# Patient Record
Sex: FEMALE | Race: WHITE | NOT HISPANIC OR LATINO | URBAN - METROPOLITAN AREA
[De-identification: names, ages, dates, MRNs, and addresses within clinical notes are randomized per-mention and may not be internally consistent; named-entity substitution may affect disease eponyms.]

---

## 2018-07-16 ENCOUNTER — OFFICE (OUTPATIENT)
Dept: URBAN - METROPOLITAN AREA CLINIC 101 | Facility: CLINIC | Age: 83
End: 2018-07-16

## 2018-07-16 VITALS
DIASTOLIC BLOOD PRESSURE: 82 MMHG | WEIGHT: 212 LBS | HEIGHT: 62 IN | HEART RATE: 90 BPM | SYSTOLIC BLOOD PRESSURE: 160 MMHG | TEMPERATURE: 97.5 F

## 2018-07-16 DIAGNOSIS — R10.11 RIGHT UPPER QUADRANT PAIN: ICD-10-CM

## 2018-07-16 PROCEDURE — 99204 OFFICE O/P NEW MOD 45 MIN: CPT

## 2018-08-14 ENCOUNTER — ON CAMPUS - OUTPATIENT (OUTPATIENT)
Dept: URBAN - METROPOLITAN AREA HOSPITAL 35 | Facility: HOSPITAL | Age: 83
End: 2018-08-14
Payer: COMMERCIAL

## 2018-08-14 DIAGNOSIS — K29.60 OTHER GASTRITIS WITHOUT BLEEDING: ICD-10-CM

## 2018-08-14 DIAGNOSIS — K21.0 GASTRO-ESOPHAGEAL REFLUX DISEASE WITH ESOPHAGITIS: ICD-10-CM

## 2018-08-14 DIAGNOSIS — R10.13 EPIGASTRIC PAIN: ICD-10-CM

## 2018-08-14 DIAGNOSIS — R13.10 DYSPHAGIA, UNSPECIFIED: ICD-10-CM

## 2018-08-14 PROCEDURE — 43239 EGD BIOPSY SINGLE/MULTIPLE: CPT

## 2018-09-18 ENCOUNTER — OFFICE (OUTPATIENT)
Dept: URBAN - METROPOLITAN AREA CLINIC 33 | Facility: CLINIC | Age: 83
End: 2018-09-18
Payer: COMMERCIAL

## 2018-09-18 VITALS
TEMPERATURE: 97.2 F | SYSTOLIC BLOOD PRESSURE: 134 MMHG | DIASTOLIC BLOOD PRESSURE: 73 MMHG | HEIGHT: 62 IN | WEIGHT: 208 LBS | HEART RATE: 81 BPM

## 2018-09-18 DIAGNOSIS — R10.11 RIGHT UPPER QUADRANT PAIN: ICD-10-CM

## 2018-09-18 PROCEDURE — 99213 OFFICE O/P EST LOW 20 MIN: CPT

## 2018-09-18 NOTE — SERVICEHPINOTES
Ms. Peralta is an 82 yr old female here for follow up regarding RUQ pain. She saw Dr. Fulton for this two months ago. Pain used to occur daily and her RUQ area felt "large" and she had dull pain that could last all day. No sharp pain or relationship to PO intake. She was concerned about gallstones. She underwent a RUQ US which was unremarkable. A HIDA scan revealed an EF of 29%. She saw a general surgeon who recommended that she try a PPI. She was prescribed Pantoprazole which has fully alleviated all of her pain one day after she started it. She underwent an EGD which showed mild gastritis and esophagitis. Prior to developing the RUQ pain she had heartburn and reflux. She has no UGI issues whatsoever since starting Pantoprazole. She takes a daily baby ASA when she remembers and Ibuprofen "at times". She had a negative cologuard test last year.

## 2019-12-13 ENCOUNTER — OFFICE (OUTPATIENT)
Dept: URBAN - METROPOLITAN AREA CLINIC 78 | Facility: CLINIC | Age: 84
End: 2019-12-13
Payer: COMMERCIAL

## 2019-12-13 VITALS — WEIGHT: 227 LBS | TEMPERATURE: 97.2 F | HEIGHT: 62 IN

## 2019-12-13 DIAGNOSIS — R10.11 RIGHT UPPER QUADRANT PAIN: ICD-10-CM

## 2019-12-13 PROCEDURE — 99214 OFFICE O/P EST MOD 30 MIN: CPT

## 2019-12-13 NOTE — SERVICEHPINOTES
Ms. Peralta is an 82 yr old female here for follow up regarding RUQ pain. She was evaluated for this pain last year by Dr. Fulton. Pain used to occur daily and her RUQ area felt "large" and she had dull pain that could last all day. No sharp pain or relationship to PO intake. She was concerned about gallstones. She underwent a RUQ US which was unremarkable. A HIDA scan revealed an EF of 29%. She saw a general surgeon who recommended that she try a PPI. She underwent an EGD which showed mild gastritis and esophagitis. She was prescribed Pantoprazole which immediately alleviated all pain for one year until recently. Right after Thanksgiving of 2019 the pain returned, though not as intense and in a smaller area. Pain lasted for a few days and went away. She is now completely asymptomatic with no UGI issues.

## 2021-12-23 ENCOUNTER — APPOINTMENT (RX ONLY)
Dept: URBAN - METROPOLITAN AREA CLINIC 371 | Facility: CLINIC | Age: 86
Setting detail: DERMATOLOGY
End: 2021-12-23

## 2021-12-23 DIAGNOSIS — D22 MELANOCYTIC NEVI: ICD-10-CM

## 2021-12-23 DIAGNOSIS — L82.1 OTHER SEBORRHEIC KERATOSIS: ICD-10-CM

## 2021-12-23 PROBLEM — D48.5 NEOPLASM OF UNCERTAIN BEHAVIOR OF SKIN: Status: ACTIVE | Noted: 2021-12-23

## 2021-12-23 PROCEDURE — 11102 TANGNTL BX SKIN SINGLE LES: CPT

## 2021-12-23 PROCEDURE — 99202 OFFICE O/P NEW SF 15 MIN: CPT | Mod: 25

## 2021-12-23 PROCEDURE — ? COUNSELING

## 2021-12-23 PROCEDURE — ? BIOPSY BY SHAVE METHOD

## 2021-12-23 ASSESSMENT — LOCATION ZONE DERM
LOCATION ZONE: TRUNK
LOCATION ZONE: LEG

## 2021-12-23 ASSESSMENT — LOCATION SIMPLE DESCRIPTION DERM
LOCATION SIMPLE: UPPER BACK
LOCATION SIMPLE: LEFT PRETIBIAL REGION

## 2021-12-23 ASSESSMENT — LOCATION DETAILED DESCRIPTION DERM
LOCATION DETAILED: SUPERIOR THORACIC SPINE
LOCATION DETAILED: LEFT PROXIMAL PRETIBIAL REGION

## 2021-12-23 ASSESSMENT — PAIN INTENSITY VAS: HOW INTENSE IS YOUR PAIN 0 BEING NO PAIN, 10 BEING THE MOST SEVERE PAIN POSSIBLE?: NO PAIN

## 2021-12-23 NOTE — PROCEDURE: BIOPSY BY SHAVE METHOD
Anesthesia Volume In Cc (Will Not Render If 0): 0.5
Dressing: bandage
Information: Selecting Yes will display possible errors in your note based on the variables you have selected. This validation is only offered as a suggestion for you. PLEASE NOTE THAT THE VALIDATION TEXT WILL BE REMOVED WHEN YOU FINALIZE YOUR NOTE. IF YOU WANT TO FAX A PRELIMINARY NOTE YOU WILL NEED TO TOGGLE THIS TO 'NO' IF YOU DO NOT WANT IT IN YOUR FAXED NOTE.
Depth Of Biopsy: dermis
Hide Biopsy Depth?: No
X Size Of Lesion In Cm: 0
Curettage Text: The wound bed was treated with curettage after the biopsy was performed.
Billing Type: Third-Party Bill
Silver Nitrate Text: The wound bed was treated with silver nitrate after the biopsy was performed.
Wound Care: Petrolatum
Post-Care Instructions: I reviewed with the patient in detail post-care instructions. Patient is to keep the biopsy site dry overnight, and then apply bacitracin twice daily until healed. Patient may apply hydrogen peroxide soaks to remove any crusting.
Electrodesiccation And Curettage Text: The wound bed was treated with electrodesiccation and curettage after the biopsy was performed.
Electrodesiccation Text: The wound bed was treated with electrodesiccation after the biopsy was performed.
Anesthesia Type: 1% lidocaine with epinephrine
Biopsy Method: Dermablade
Type Of Destruction Used: Curettage
Detail Level: Detailed
Consent: Written consent was obtained and risks were reviewed including but not limited to scarring, infection, bleeding, scabbing, incomplete removal, nerve damage and allergy to anesthesia.
Cryotherapy Text: The wound bed was treated with cryotherapy after the biopsy was performed.
Biopsy Type: H and E
Notification Instructions: Patient will be notified of biopsy results. However, patient instructed to call the office if not contacted within 2 weeks.
Hemostasis: Aluminum Chloride
Was A Bandage Applied: Yes

## 2022-01-10 ENCOUNTER — PREPPED CHART (OUTPATIENT)
Dept: URBAN - METROPOLITAN AREA CLINIC 66 | Facility: CLINIC | Age: 87
End: 2022-01-10

## 2022-01-10 PROBLEM — H35.3112 DRY AMD, INTERMEDIATE DRY STAGE: Status: STABILIZING | Noted: 2022-01-10

## 2022-01-10 PROBLEM — E11.9 DIABETES, TYPE II, NO OCULAR COMPLICATIONS: Status: STABILIZING | Noted: 2022-01-10

## 2022-01-10 PROBLEM — H40.052 OCULAR HYPERTENSION: Noted: 2022-01-10

## 2022-01-10 PROBLEM — H43.813 POSTERIOR VITREOUS DETACHMENT: Noted: 2022-01-10

## 2022-01-10 PROBLEM — H43.813 POSTERIOR VITREOUS DETACHMENT: Status: STABILIZING | Noted: 2022-01-10

## 2022-01-10 PROBLEM — H35.3221 NEOVASCULAR AMD WITH ACTIVE CNV: Status: STABILIZING | Noted: 2022-01-10

## 2022-01-10 PROBLEM — H35.3112 DRY AMD, INTERMEDIATE DRY STAGE: Noted: 2022-01-10

## 2022-01-10 PROBLEM — H40.053 OCULAR HYPERTENSION: Noted: 2022-01-10

## 2022-01-10 PROBLEM — E11.9 DIABETES, TYPE II, NO OCULAR COMPLICATIONS: Noted: 2022-01-10

## 2022-01-10 PROBLEM — H35.3221 NEOVASCULAR AMD WITH ACTIVE CNV: Noted: 2022-01-10

## 2022-01-10 PROBLEM — H35.443 AGE-RELATED RETICULAR DEGENERATION OF RETINA: Noted: 2022-01-10

## 2022-02-24 ENCOUNTER — APPOINTMENT (RX ONLY)
Dept: URBAN - METROPOLITAN AREA CLINIC 371 | Facility: CLINIC | Age: 87
Setting detail: DERMATOLOGY
End: 2022-02-24

## 2022-02-24 DIAGNOSIS — D18.0 HEMANGIOMA: ICD-10-CM

## 2022-02-24 DIAGNOSIS — L57.0 ACTINIC KERATOSIS: ICD-10-CM | Status: INADEQUATELY CONTROLLED

## 2022-02-24 DIAGNOSIS — Z71.89 OTHER SPECIFIED COUNSELING: ICD-10-CM

## 2022-02-24 DIAGNOSIS — L81.4 OTHER MELANIN HYPERPIGMENTATION: ICD-10-CM

## 2022-02-24 DIAGNOSIS — D22 MELANOCYTIC NEVI: ICD-10-CM

## 2022-02-24 DIAGNOSIS — L82.1 OTHER SEBORRHEIC KERATOSIS: ICD-10-CM

## 2022-02-24 PROBLEM — D18.01 HEMANGIOMA OF SKIN AND SUBCUTANEOUS TISSUE: Status: ACTIVE | Noted: 2022-02-24

## 2022-02-24 PROBLEM — D22.5 MELANOCYTIC NEVI OF TRUNK: Status: ACTIVE | Noted: 2022-02-24

## 2022-02-24 PROCEDURE — 99213 OFFICE O/P EST LOW 20 MIN: CPT | Mod: 25

## 2022-02-24 PROCEDURE — ? LIQUID NITROGEN

## 2022-02-24 PROCEDURE — ? COUNSELING

## 2022-02-24 PROCEDURE — 17000 DESTRUCT PREMALG LESION: CPT

## 2022-02-24 ASSESSMENT — LOCATION DETAILED DESCRIPTION DERM
LOCATION DETAILED: RIGHT PROXIMAL POSTERIOR UPPER ARM
LOCATION DETAILED: EPIGASTRIC SKIN
LOCATION DETAILED: PERIUMBILICAL SKIN
LOCATION DETAILED: MIDDLE STERNUM

## 2022-02-24 ASSESSMENT — TOTAL NUMBER OF LESIONS: # OF LESIONS?: 1

## 2022-02-24 ASSESSMENT — LOCATION SIMPLE DESCRIPTION DERM
LOCATION SIMPLE: CHEST
LOCATION SIMPLE: ABDOMEN
LOCATION SIMPLE: RIGHT POSTERIOR UPPER ARM

## 2022-02-24 ASSESSMENT — LOCATION ZONE DERM
LOCATION ZONE: ARM
LOCATION ZONE: TRUNK

## 2022-02-24 ASSESSMENT — PAIN INTENSITY VAS: HOW INTENSE IS YOUR PAIN 0 BEING NO PAIN, 10 BEING THE MOST SEVERE PAIN POSSIBLE?: NO PAIN

## 2022-02-24 NOTE — PROCEDURE: LIQUID NITROGEN
Number Of Freeze-Thaw Cycles: 1 freeze-thaw cycle
Detail Level: Zone
Render Note In Bullet Format When Appropriate: No
Show Applicator Variable?: Yes
Consent: The patient's consent was obtained including but not limited to risks of crusting, scabbing, blistering, scarring, darker or lighter pigmentary change, recurrence, incomplete removal and infection.
Duration Of Freeze Thaw-Cycle (Seconds): 0
Post-Care Instructions: I reviewed with the patient in detail post-care instructions. Patient is to wear sunprotection, and avoid picking at any of the treated lesions. Pt may apply Vaseline to crusted or scabbing areas.

## 2022-03-21 ASSESSMENT — TONOMETRY
OD_IOP_MMHG: 20
OS_IOP_MMHG: 21

## 2022-03-21 ASSESSMENT — VISUAL ACUITY
OS_SC: 20/30-2
OS_PH: 20/25-1
OD_PH: 20/20
OD_SC: 20/30-1

## 2022-03-24 ENCOUNTER — FOLLOW UP (OUTPATIENT)
Dept: URBAN - METROPOLITAN AREA CLINIC 66 | Facility: CLINIC | Age: 87
End: 2022-03-24

## 2022-03-24 DIAGNOSIS — H35.3112: ICD-10-CM

## 2022-03-24 DIAGNOSIS — H35.3221: ICD-10-CM

## 2022-03-24 PROCEDURE — 99214 OFFICE O/P EST MOD 30 MIN: CPT | Mod: 25

## 2022-03-24 PROCEDURE — 67028 INJECTION EYE DRUG: CPT

## 2022-03-24 PROCEDURE — PF5 LUCENTIS PREFILLED SYRINGE

## 2022-03-24 PROCEDURE — 92134 CPTRZ OPH DX IMG PST SGM RTA: CPT

## 2022-03-24 ASSESSMENT — TONOMETRY
OD_IOP_MMHG: 22
OS_IOP_MMHG: 20

## 2022-03-24 ASSESSMENT — VISUAL ACUITY
OD_SC: 20/25
OS_SC: 20/40-

## 2022-05-26 ENCOUNTER — FOLLOW UP (OUTPATIENT)
Dept: URBAN - METROPOLITAN AREA CLINIC 66 | Facility: CLINIC | Age: 87
End: 2022-05-26

## 2022-05-26 DIAGNOSIS — H35.3221: ICD-10-CM

## 2022-05-26 DIAGNOSIS — H40.052: ICD-10-CM

## 2022-05-26 DIAGNOSIS — E11.9: ICD-10-CM

## 2022-05-26 DIAGNOSIS — H35.3112: ICD-10-CM

## 2022-05-26 DIAGNOSIS — H43.813: ICD-10-CM

## 2022-05-26 PROCEDURE — 99214 OFFICE O/P EST MOD 30 MIN: CPT | Mod: 25

## 2022-05-26 PROCEDURE — PF5 LUCENTIS PREFILLED SYRINGE

## 2022-05-26 PROCEDURE — 67028 INJECTION EYE DRUG: CPT

## 2022-05-26 PROCEDURE — 92134 CPTRZ OPH DX IMG PST SGM RTA: CPT

## 2022-05-26 ASSESSMENT — VISUAL ACUITY
OS_PH: 20/20-2
OD_SC: 20/20
OS_SC: 20/60

## 2022-05-26 ASSESSMENT — TONOMETRY
OS_IOP_MMHG: 21
OD_IOP_MMHG: 24

## 2022-07-08 ENCOUNTER — OFFICE (OUTPATIENT)
Dept: URBAN - METROPOLITAN AREA TELEHEALTH 3 | Facility: TELEHEALTH | Age: 87
End: 2022-07-08
Payer: COMMERCIAL

## 2022-07-08 VITALS — WEIGHT: 230 LBS | HEIGHT: 62 IN

## 2022-07-08 DIAGNOSIS — R10.11 RIGHT UPPER QUADRANT PAIN: ICD-10-CM

## 2022-07-08 DIAGNOSIS — K59.09 OTHER CONSTIPATION: ICD-10-CM

## 2022-07-08 PROCEDURE — 99214 OFFICE O/P EST MOD 30 MIN: CPT | Performed by: PHYSICIAN ASSISTANT

## 2022-07-08 NOTE — SERVICEHPINOTES
PATIENT VERIFIED BY DATE OF BIRTH AND NAME. Patient has been consented for this telecommunication visit.   Pt is here for f/u. She has followed with Dr. Fulton previously for abdominal. She has chronic constipation and wants to know what to take for this. Has never tried Miralax. She notes constipation since at least 1970s. She notices that the constipation exacerbates her back pain. No abdominal pain, wt loss, or blood in the stool. Doesn't want a colonoscopy.  
br

florencio A HIDA scan revealed an EF of 29%. She saw a general surgeon who recommended that she try a PPI. She underwent an EGD which showed mild gastritis and esophagitis. She was on Pantoprazole with goo control of her RUQ pain but her nephrologist advises against it. Famotidine 10 mg TID keeps symptoms under control as well. 
br
br
br  ROS as per HPI and o/w unremarkable.florencio matias

## 2022-07-14 ENCOUNTER — FOLLOW UP (OUTPATIENT)
Dept: URBAN - METROPOLITAN AREA CLINIC 66 | Facility: CLINIC | Age: 87
End: 2022-07-14

## 2022-07-14 DIAGNOSIS — H43.813: ICD-10-CM

## 2022-07-14 DIAGNOSIS — H40.052: ICD-10-CM

## 2022-07-14 DIAGNOSIS — E11.9: ICD-10-CM

## 2022-07-14 DIAGNOSIS — H35.3112: ICD-10-CM

## 2022-07-14 DIAGNOSIS — H35.3221: ICD-10-CM

## 2022-07-14 PROCEDURE — PF5 LUCENTIS PREFILLED SYRINGE

## 2022-07-14 PROCEDURE — 92014 COMPRE OPH EXAM EST PT 1/>: CPT | Mod: 25

## 2022-07-14 PROCEDURE — 92134 CPTRZ OPH DX IMG PST SGM RTA: CPT

## 2022-07-14 PROCEDURE — 67028 INJECTION EYE DRUG: CPT

## 2022-07-14 ASSESSMENT — VISUAL ACUITY
OS_PH: 20/25
OD_SC: 20/30
OD_PH: 20/25-2
OS_SC: 20/40

## 2022-07-14 ASSESSMENT — TONOMETRY
OD_IOP_MMHG: 20
OS_IOP_MMHG: 19

## 2022-09-15 ENCOUNTER — FOLLOW UP (OUTPATIENT)
Dept: URBAN - METROPOLITAN AREA CLINIC 66 | Facility: CLINIC | Age: 87
End: 2022-09-15

## 2022-09-15 DIAGNOSIS — E11.9: ICD-10-CM

## 2022-09-15 DIAGNOSIS — H43.813: ICD-10-CM

## 2022-09-15 DIAGNOSIS — H35.3112: ICD-10-CM

## 2022-09-15 DIAGNOSIS — H35.3221: ICD-10-CM

## 2022-09-15 DIAGNOSIS — H40.052: ICD-10-CM

## 2022-09-15 PROCEDURE — 99214 OFFICE O/P EST MOD 30 MIN: CPT | Mod: 25

## 2022-09-15 PROCEDURE — 67028 INJECTION EYE DRUG: CPT

## 2022-09-15 PROCEDURE — 92134 CPTRZ OPH DX IMG PST SGM RTA: CPT

## 2022-09-15 PROCEDURE — PF5 LUCENTIS PREFILLED SYRINGE

## 2022-09-15 ASSESSMENT — VISUAL ACUITY
OD_SC: 20/30+2
OD_PH: 20/20
OS_SC: 20/50
OS_PH: 20/25-1

## 2022-09-15 ASSESSMENT — TONOMETRY
OS_IOP_MMHG: 20
OD_IOP_MMHG: 21

## 2022-11-10 ENCOUNTER — FOLLOW UP (OUTPATIENT)
Dept: URBAN - METROPOLITAN AREA CLINIC 66 | Facility: CLINIC | Age: 87
End: 2022-11-10

## 2022-11-10 DIAGNOSIS — H35.3112: ICD-10-CM

## 2022-11-10 DIAGNOSIS — E11.9: ICD-10-CM

## 2022-11-10 DIAGNOSIS — H43.813: ICD-10-CM

## 2022-11-10 DIAGNOSIS — H35.3221: ICD-10-CM

## 2022-11-10 DIAGNOSIS — H40.052: ICD-10-CM

## 2022-11-10 PROCEDURE — 92134 CPTRZ OPH DX IMG PST SGM RTA: CPT

## 2022-11-10 PROCEDURE — 99214 OFFICE O/P EST MOD 30 MIN: CPT | Mod: 25

## 2022-11-10 PROCEDURE — PF5 LUCENTIS PREFILLED SYRINGE

## 2022-11-10 PROCEDURE — 67028 INJECTION EYE DRUG: CPT

## 2022-11-10 ASSESSMENT — VISUAL ACUITY
OD_SC: 20/30+2
OS_SC: 20/50-2

## 2022-11-10 ASSESSMENT — TONOMETRY
OS_IOP_MMHG: 19
OD_IOP_MMHG: 23

## 2023-01-05 ENCOUNTER — FOLLOW UP (OUTPATIENT)
Dept: URBAN - METROPOLITAN AREA CLINIC 66 | Facility: CLINIC | Age: 88
End: 2023-01-05

## 2023-01-05 DIAGNOSIS — H35.3112: ICD-10-CM

## 2023-01-05 DIAGNOSIS — H43.813: ICD-10-CM

## 2023-01-05 DIAGNOSIS — H35.3221: ICD-10-CM

## 2023-01-05 DIAGNOSIS — E11.9: ICD-10-CM

## 2023-01-05 DIAGNOSIS — H40.052: ICD-10-CM

## 2023-01-05 PROCEDURE — 99214 OFFICE O/P EST MOD 30 MIN: CPT | Mod: 25

## 2023-01-05 PROCEDURE — 67028 INJECTION EYE DRUG: CPT

## 2023-01-05 PROCEDURE — PF5 LUCENTIS PREFILLED SYRINGE

## 2023-01-05 PROCEDURE — 92134 CPTRZ OPH DX IMG PST SGM RTA: CPT

## 2023-01-05 ASSESSMENT — TONOMETRY
OS_IOP_MMHG: 24
OD_IOP_MMHG: 26
OD_IOP_MMHG: 22
OS_IOP_MMHG: 22

## 2023-01-05 ASSESSMENT — VISUAL ACUITY
OS_SC: 20/50
OD_SC: 20/25-2

## 2023-02-27 ENCOUNTER — FOLLOW UP (OUTPATIENT)
Dept: URBAN - METROPOLITAN AREA CLINIC 66 | Facility: CLINIC | Age: 88
End: 2023-02-27

## 2023-02-27 DIAGNOSIS — H35.3112: ICD-10-CM

## 2023-02-27 DIAGNOSIS — E11.9: ICD-10-CM

## 2023-02-27 DIAGNOSIS — H35.3221: ICD-10-CM

## 2023-02-27 DIAGNOSIS — H43.813: ICD-10-CM

## 2023-02-27 PROCEDURE — 92134 CPTRZ OPH DX IMG PST SGM RTA: CPT

## 2023-02-27 PROCEDURE — 99214 OFFICE O/P EST MOD 30 MIN: CPT | Mod: 25

## 2023-02-27 PROCEDURE — PF5 LUCENTIS PREFILLED SYRINGE

## 2023-02-27 PROCEDURE — 67028 INJECTION EYE DRUG: CPT

## 2023-02-27 ASSESSMENT — TONOMETRY
OD_IOP_MMHG: 23
OD_IOP_MMHG: 26
OS_IOP_MMHG: 30
OS_IOP_MMHG: 27

## 2023-02-27 ASSESSMENT — VISUAL ACUITY
OS_SC: 20/80
OS_PH: 20/25-2
OD_SC: 20/40
OD_PH: 20/25

## 2023-04-17 ENCOUNTER — FOLLOW UP (OUTPATIENT)
Dept: URBAN - METROPOLITAN AREA CLINIC 66 | Facility: CLINIC | Age: 88
End: 2023-04-17

## 2023-04-17 DIAGNOSIS — H35.3221: ICD-10-CM

## 2023-04-17 DIAGNOSIS — H40.053: ICD-10-CM

## 2023-04-17 DIAGNOSIS — H35.3112: ICD-10-CM

## 2023-04-17 DIAGNOSIS — E11.9: ICD-10-CM

## 2023-04-17 DIAGNOSIS — H43.813: ICD-10-CM

## 2023-04-17 PROCEDURE — 92202 OPSCPY EXTND ON/MAC DRAW: CPT | Mod: 59

## 2023-04-17 PROCEDURE — 92134 CPTRZ OPH DX IMG PST SGM RTA: CPT

## 2023-04-17 PROCEDURE — 67028 INJECTION EYE DRUG: CPT

## 2023-04-17 PROCEDURE — PF5 LUCENTIS PREFILLED SYRINGE

## 2023-04-17 PROCEDURE — 92014 COMPRE OPH EXAM EST PT 1/>: CPT | Mod: 25

## 2023-04-17 ASSESSMENT — VISUAL ACUITY
OS_PH: 20/25-2
OD_SC: 20/30+1
OD_PH: 20/25-2
OS_SC: 20/40-2

## 2023-04-17 ASSESSMENT — TONOMETRY
OD_IOP_MMHG: 21
OS_IOP_MMHG: 19

## 2023-06-05 ENCOUNTER — FOLLOW UP (OUTPATIENT)
Dept: URBAN - METROPOLITAN AREA CLINIC 66 | Facility: CLINIC | Age: 88
End: 2023-06-05

## 2023-06-05 DIAGNOSIS — H43.813: ICD-10-CM

## 2023-06-05 DIAGNOSIS — H35.3221: ICD-10-CM

## 2023-06-05 DIAGNOSIS — H40.053: ICD-10-CM

## 2023-06-05 DIAGNOSIS — E11.9: ICD-10-CM

## 2023-06-05 DIAGNOSIS — H35.3112: ICD-10-CM

## 2023-06-05 PROCEDURE — 92014 COMPRE OPH EXAM EST PT 1/>: CPT | Mod: 25

## 2023-06-05 PROCEDURE — 92202 OPSCPY EXTND ON/MAC DRAW: CPT | Mod: NC

## 2023-06-05 PROCEDURE — PF5 LUCENTIS PREFILLED SYRINGE

## 2023-06-05 PROCEDURE — 67028 INJECTION EYE DRUG: CPT

## 2023-06-05 PROCEDURE — 92134 CPTRZ OPH DX IMG PST SGM RTA: CPT

## 2023-06-05 ASSESSMENT — VISUAL ACUITY
OS_SC: 20/30-2
OD_SC: 20/25+1
OS_PH: 20/20-2

## 2023-06-05 ASSESSMENT — TONOMETRY
OD_IOP_MMHG: 26
OD_IOP_MMHG: 22
OS_IOP_MMHG: 25
OS_IOP_MMHG: 27

## 2023-07-31 ENCOUNTER — FOLLOW UP (OUTPATIENT)
Dept: URBAN - METROPOLITAN AREA CLINIC 66 | Facility: CLINIC | Age: 88
End: 2023-07-31

## 2023-07-31 DIAGNOSIS — H35.3112: ICD-10-CM

## 2023-07-31 DIAGNOSIS — H40.053: ICD-10-CM

## 2023-07-31 DIAGNOSIS — E11.9: ICD-10-CM

## 2023-07-31 DIAGNOSIS — H35.3221: ICD-10-CM

## 2023-07-31 DIAGNOSIS — H43.813: ICD-10-CM

## 2023-07-31 PROCEDURE — 92202 OPSCPY EXTND ON/MAC DRAW: CPT | Mod: NC

## 2023-07-31 PROCEDURE — 67028 INJECTION EYE DRUG: CPT

## 2023-07-31 PROCEDURE — 92134 CPTRZ OPH DX IMG PST SGM RTA: CPT

## 2023-07-31 PROCEDURE — PF5 LUCENTIS PREFILLED SYRINGE: Mod: JZ

## 2023-07-31 PROCEDURE — 92250 FUNDUS PHOTOGRAPHY W/I&R: CPT | Mod: NC

## 2023-07-31 PROCEDURE — 92014 COMPRE OPH EXAM EST PT 1/>: CPT | Mod: 25

## 2023-07-31 ASSESSMENT — VISUAL ACUITY
OS_SC: 20/50-2
OS_PH: 20/30-2
OD_PH: 20/20-2
OD_SC: 20/30+2

## 2023-07-31 ASSESSMENT — TONOMETRY
OD_IOP_MMHG: 25
OS_IOP_MMHG: 20

## 2023-09-25 ENCOUNTER — FOLLOW UP (OUTPATIENT)
Dept: URBAN - METROPOLITAN AREA CLINIC 66 | Facility: CLINIC | Age: 88
End: 2023-09-25

## 2023-09-25 DIAGNOSIS — E11.9: ICD-10-CM

## 2023-09-25 DIAGNOSIS — H35.3221: ICD-10-CM

## 2023-09-25 DIAGNOSIS — H40.053: ICD-10-CM

## 2023-09-25 DIAGNOSIS — H43.813: ICD-10-CM

## 2023-09-25 DIAGNOSIS — H35.3112: ICD-10-CM

## 2023-09-25 PROCEDURE — 67028 INJECTION EYE DRUG: CPT

## 2023-09-25 PROCEDURE — 92202 OPSCPY EXTND ON/MAC DRAW: CPT | Mod: 59

## 2023-09-25 PROCEDURE — 92014 COMPRE OPH EXAM EST PT 1/>: CPT | Mod: 25

## 2023-09-25 PROCEDURE — PF5 LUCENTIS PREFILLED SYRINGE: Mod: JZ

## 2023-09-25 PROCEDURE — 92134 CPTRZ OPH DX IMG PST SGM RTA: CPT

## 2023-09-25 ASSESSMENT — VISUAL ACUITY
OS_PH: 20/30+1
OS_SC: 20/60-2
OD_SC: 20/25

## 2023-09-25 ASSESSMENT — TONOMETRY
OS_IOP_MMHG: 24
OD_IOP_MMHG: 22

## 2023-11-27 ENCOUNTER — FOLLOW UP (OUTPATIENT)
Dept: URBAN - METROPOLITAN AREA CLINIC 66 | Facility: CLINIC | Age: 88
End: 2023-11-27

## 2023-11-27 DIAGNOSIS — H35.3112: ICD-10-CM

## 2023-11-27 DIAGNOSIS — H35.3221: ICD-10-CM

## 2023-11-27 DIAGNOSIS — H43.813: ICD-10-CM

## 2023-11-27 PROCEDURE — 92134 CPTRZ OPH DX IMG PST SGM RTA: CPT

## 2023-11-27 PROCEDURE — 92202 OPSCPY EXTND ON/MAC DRAW: CPT

## 2023-11-27 PROCEDURE — 92014 COMPRE OPH EXAM EST PT 1/>: CPT

## 2023-11-27 ASSESSMENT — TONOMETRY
OD_IOP_MMHG: 19
OS_IOP_MMHG: 19

## 2023-11-27 ASSESSMENT — VISUAL ACUITY
OD_SC: 20/40
OS_SC: 20/80-2
OD_PH: 20/25
OS_PH: 20/25-2

## 2024-01-18 ENCOUNTER — FOLLOW UP (OUTPATIENT)
Dept: URBAN - METROPOLITAN AREA CLINIC 66 | Facility: CLINIC | Age: 89
End: 2024-01-18

## 2024-01-18 DIAGNOSIS — H40.053: ICD-10-CM

## 2024-01-18 DIAGNOSIS — H35.3112: ICD-10-CM

## 2024-01-18 DIAGNOSIS — H35.3221: ICD-10-CM

## 2024-01-18 DIAGNOSIS — E11.9: ICD-10-CM

## 2024-01-18 DIAGNOSIS — H43.813: ICD-10-CM

## 2024-01-18 PROCEDURE — 92014 COMPRE OPH EXAM EST PT 1/>: CPT | Mod: 25

## 2024-01-18 PROCEDURE — 92134 CPTRZ OPH DX IMG PST SGM RTA: CPT

## 2024-01-18 PROCEDURE — 92202 OPSCPY EXTND ON/MAC DRAW: CPT | Mod: 59

## 2024-01-18 PROCEDURE — 67028 INJECTION EYE DRUG: CPT

## 2024-01-18 ASSESSMENT — TONOMETRY
OS_IOP_MMHG: 18
OD_IOP_MMHG: 18

## 2024-01-18 ASSESSMENT — VISUAL ACUITY
OS_SC: 20/25-1
OD_SC: 20/25-2

## 2024-03-21 ENCOUNTER — FOLLOW UP (OUTPATIENT)
Dept: URBAN - METROPOLITAN AREA CLINIC 66 | Facility: CLINIC | Age: 89
End: 2024-03-21

## 2024-03-21 DIAGNOSIS — E11.9: ICD-10-CM

## 2024-03-21 DIAGNOSIS — H35.3112: ICD-10-CM

## 2024-03-21 DIAGNOSIS — H43.813: ICD-10-CM

## 2024-03-21 DIAGNOSIS — H35.3221: ICD-10-CM

## 2024-03-21 DIAGNOSIS — H40.053: ICD-10-CM

## 2024-03-21 PROCEDURE — 67028 INJECTION EYE DRUG: CPT

## 2024-03-21 PROCEDURE — 92202 OPSCPY EXTND ON/MAC DRAW: CPT | Mod: 59

## 2024-03-21 PROCEDURE — 92134 CPTRZ OPH DX IMG PST SGM RTA: CPT

## 2024-03-21 PROCEDURE — 92014 COMPRE OPH EXAM EST PT 1/>: CPT | Mod: 25

## 2024-03-21 ASSESSMENT — TONOMETRY
OS_IOP_MMHG: 21
OD_IOP_MMHG: 20

## 2024-03-21 ASSESSMENT — VISUAL ACUITY
OD_PH: 20/25+1
OD_SC: 20/30-1
OS_SC: 20/25+2